# Patient Record
Sex: MALE | Race: WHITE
[De-identification: names, ages, dates, MRNs, and addresses within clinical notes are randomized per-mention and may not be internally consistent; named-entity substitution may affect disease eponyms.]

---

## 2022-04-07 NOTE — NUR
BIBS C/O FEELING S/I WITH PLAN TO OD ON PILLS [facility]. PATIENT ALERT AND ORIENTED X3. AMBULATORY WITH NON LABORED 
BREATHING IN BED 18 AWAITING MD TONEY.

## 2022-04-08 NOTE — NUR
[facility] Personnel here to transport patient. NO acute changes, NO 
distress noted. Ambulatory and cooperative during transport
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
## 2022-05-21 NOTE — NUR
PT BIB SELF C/O SI "I WANT TO OD ON PILLS" REQUESTING VOLUNTARY PSYCH ADMISSION 
TO [facility]. PT IS AAOX4, NOT IN RESPIRATORY DISTRESS, V/S STABLE, KEPT 
RESTED AND COMFORTABLE. WILL CONTINUE TO MONITOR.

## 2022-05-22 NOTE — NUR
PT IS ACCEPTED AT [facility] UNDER THE CARE OF DR. LINTON. CALL [phone]. PT WILL BE ACCOMODATED FOR ADMISSION AT 1500.

## 2022-05-22 NOTE — NUR
ASSESSED PT ON BED ASLEEP EASILY AROUSABLE, AAOX4, NOT IN RESPIRATORY DISTRESS, 
V/S STABLE, KEPT RESTED AND COMFORTABLE. WILL CONTINUE TO MONITOR.

## 2022-06-01 NOTE — NUR
Pt. accepted to [facility] under Dr. Zapata. Call and report to Unit [address] [[phone] 
ext. [phone]].
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
## 2022-09-08 ENCOUNTER — HOSPITAL ENCOUNTER (EMERGENCY)
Dept: HOSPITAL 54 - ER | Age: 37
Discharge: PSYCHIATRIC OR SUBSTANCE USE FACILITY | End: 2022-09-08
Payer: COMMERCIAL

## 2022-09-08 VITALS — WEIGHT: 185 LBS | HEIGHT: 68 IN | BODY MASS INDEX: 28.04 KG/M2

## 2022-09-08 VITALS — SYSTOLIC BLOOD PRESSURE: 131 MMHG | DIASTOLIC BLOOD PRESSURE: 88 MMHG

## 2022-09-08 DIAGNOSIS — R03.0: ICD-10-CM

## 2022-09-08 DIAGNOSIS — F12.10: ICD-10-CM

## 2022-09-08 DIAGNOSIS — Z20.822: ICD-10-CM

## 2022-09-08 DIAGNOSIS — R45.851: Primary | ICD-10-CM

## 2022-09-08 DIAGNOSIS — F41.9: ICD-10-CM

## 2022-09-08 LAB
ALBUMIN SERPL BCP-MCNC: 4.1 G/DL (ref 3.4–5)
ALP SERPL-CCNC: 96 U/L (ref 46–116)
ALT SERPL W P-5'-P-CCNC: 31 U/L (ref 12–78)
APAP SERPL-MCNC: < 10 UG/ML (ref 10–30)
AST SERPL W P-5'-P-CCNC: 5 U/L (ref 15–37)
BASOPHILS # BLD AUTO: 0 K/UL (ref 0–0.2)
BASOPHILS NFR BLD AUTO: 0.3 % (ref 0–2)
BILIRUB DIRECT SERPL-MCNC: 0.1 MG/DL (ref 0–0.2)
BILIRUB SERPL-MCNC: 0.1 MG/DL (ref 0.2–1)
BILIRUB UR QL STRIP: NEGATIVE
BUN SERPL-MCNC: 17 MG/DL (ref 7–18)
CALCIUM SERPL-MCNC: 9.6 MG/DL (ref 8.5–10.1)
CHLORIDE SERPL-SCNC: 100 MMOL/L (ref 98–107)
CO2 SERPL-SCNC: 29 MMOL/L (ref 21–32)
COLOR UR: YELLOW
CREAT SERPL-MCNC: 1.4 MG/DL (ref 0.6–1.3)
EOSINOPHIL NFR BLD AUTO: 0.3 % (ref 0–6)
ETHANOL SERPL-MCNC: < 3 MG/DL (ref 0–0)
GLUCOSE SERPL-MCNC: 111 MG/DL (ref 74–106)
GLUCOSE UR STRIP-MCNC: NEGATIVE MG/DL
HCT VFR BLD AUTO: 46 % (ref 39–51)
HGB BLD-MCNC: 14.9 G/DL (ref 13.5–17.5)
LEUKOCYTE ESTERASE UR QL STRIP: NEGATIVE
LYMPHOCYTES NFR BLD AUTO: 1.3 K/UL (ref 0.8–4.8)
LYMPHOCYTES NFR BLD AUTO: 16.4 % (ref 20–44)
MCHC RBC AUTO-ENTMCNC: 33 G/DL (ref 31–36)
MCV RBC AUTO: 93 FL (ref 80–96)
MONOCYTES NFR BLD AUTO: 0.5 K/UL (ref 0.1–1.3)
MONOCYTES NFR BLD AUTO: 7 % (ref 2–12)
NEUTROPHILS # BLD AUTO: 5.9 K/UL (ref 1.8–8.9)
NEUTROPHILS NFR BLD AUTO: 76 % (ref 43–81)
NITRITE UR QL STRIP: NEGATIVE
PH UR STRIP: 6 [PH] (ref 5–8)
PLATELET # BLD AUTO: 279 K/UL (ref 150–450)
POTASSIUM SERPL-SCNC: 4 MMOL/L (ref 3.5–5.1)
PROT SERPL-MCNC: 8.2 G/DL (ref 6.4–8.2)
PROT UR QL STRIP: NEGATIVE MG/DL
RBC # BLD AUTO: 4.97 MIL/UL (ref 4.5–6)
SODIUM SERPL-SCNC: 140 MMOL/L (ref 136–145)
UROBILINOGEN UR STRIP-MCNC: 0.2 EU/DL
WBC NRBC COR # BLD AUTO: 7.7 K/UL (ref 4.3–11)

## 2022-09-08 PROCEDURE — 87426 SARSCOV CORONAVIRUS AG IA: CPT

## 2022-09-08 PROCEDURE — G0480 DRUG TEST DEF 1-7 CLASSES: HCPCS

## 2022-09-08 PROCEDURE — 80143 DRUG ASSAY ACETAMINOPHEN: CPT

## 2022-09-08 PROCEDURE — 80320 DRUG SCREEN QUANTALCOHOLS: CPT

## 2022-09-08 PROCEDURE — 36415 COLL VENOUS BLD VENIPUNCTURE: CPT

## 2022-09-08 PROCEDURE — 80048 BASIC METABOLIC PNL TOTAL CA: CPT

## 2022-09-08 PROCEDURE — C9803 HOPD COVID-19 SPEC COLLECT: HCPCS

## 2022-09-08 PROCEDURE — 81003 URINALYSIS AUTO W/O SCOPE: CPT

## 2022-09-08 PROCEDURE — 99285 EMERGENCY DEPT VISIT HI MDM: CPT

## 2022-09-08 PROCEDURE — 80307 DRUG TEST PRSMV CHEM ANLYZR: CPT

## 2022-09-08 PROCEDURE — 80076 HEPATIC FUNCTION PANEL: CPT

## 2022-09-08 PROCEDURE — 85025 COMPLETE CBC W/AUTO DIFF WBC: CPT

## 2022-09-08 NOTE — NUR
ACCEPTED AT [facility] UNDER DR LÓPEZ 

# REPORT [phone]

TRANSPORT ETA [id number]

## 2022-09-08 NOTE — NUR
PRESENTED TO THE ER FOR C/O SI, PLANNING TO OD ON HIS MEDICATION REQUESTING 
COLUNTARY PSYCH ADMISSSION. A, OX4. AMBULATORY WITH STEADY GAITS TO THE 
BATHROOM. URINE SAMLE OBTAINED. COVID SWAB DONE AND SENT TO THE LAB. PATIENT 
WAS GOWNED UP. BELONGING TAKEN AWAY. SECURITY WAS CALLED TO WAND THE PT AND PT 
WAS PLACED ON SI PRECAUTION. WILL CONT TO MONITOR

## 2022-11-11 ENCOUNTER — HOSPITAL ENCOUNTER (EMERGENCY)
Dept: HOSPITAL 54 - ER | Age: 37
Discharge: HOME | End: 2022-11-11
Payer: COMMERCIAL

## 2022-11-11 VITALS — SYSTOLIC BLOOD PRESSURE: 132 MMHG | DIASTOLIC BLOOD PRESSURE: 84 MMHG

## 2022-11-11 VITALS — BODY MASS INDEX: 27.28 KG/M2 | WEIGHT: 180 LBS | HEIGHT: 68 IN

## 2022-11-11 DIAGNOSIS — R03.0: ICD-10-CM

## 2022-11-11 DIAGNOSIS — Z20.822: ICD-10-CM

## 2022-11-11 DIAGNOSIS — F32.A: Primary | ICD-10-CM

## 2022-11-11 DIAGNOSIS — F41.9: ICD-10-CM

## 2022-11-11 LAB
ALBUMIN SERPL BCP-MCNC: 3.9 G/DL (ref 3.4–5)
ALP SERPL-CCNC: 105 U/L (ref 46–116)
ALT SERPL W P-5'-P-CCNC: 25 U/L (ref 12–78)
APAP SERPL-MCNC: < 10 UG/ML (ref 10–30)
AST SERPL W P-5'-P-CCNC: 24 U/L (ref 15–37)
BASOPHILS # BLD AUTO: 0 K/UL (ref 0–0.2)
BASOPHILS NFR BLD AUTO: 0.3 % (ref 0–2)
BILIRUB DIRECT SERPL-MCNC: 0.1 MG/DL (ref 0–0.2)
BILIRUB SERPL-MCNC: 0.5 MG/DL (ref 0.2–1)
BILIRUB UR QL STRIP: NEGATIVE
BUN SERPL-MCNC: 12 MG/DL (ref 7–18)
CALCIUM SERPL-MCNC: 9 MG/DL (ref 8.5–10.1)
CHLORIDE SERPL-SCNC: 101 MMOL/L (ref 98–107)
CO2 SERPL-SCNC: 31 MMOL/L (ref 21–32)
COLOR UR: YELLOW
CREAT SERPL-MCNC: 1.1 MG/DL (ref 0.6–1.3)
EOSINOPHIL NFR BLD AUTO: 1.7 % (ref 0–6)
ETHANOL SERPL-MCNC: < 3 MG/DL (ref 0–0)
GLUCOSE SERPL-MCNC: 99 MG/DL (ref 74–106)
GLUCOSE UR STRIP-MCNC: NEGATIVE MG/DL
HCT VFR BLD AUTO: 44 % (ref 39–51)
HGB BLD-MCNC: 14.8 G/DL (ref 13.5–17.5)
LEUKOCYTE ESTERASE UR QL STRIP: NEGATIVE
LYMPHOCYTES NFR BLD AUTO: 1.3 K/UL (ref 0.8–4.8)
LYMPHOCYTES NFR BLD AUTO: 24.3 % (ref 20–44)
MCHC RBC AUTO-ENTMCNC: 33 G/DL (ref 31–36)
MCV RBC AUTO: 90 FL (ref 80–96)
MONOCYTES NFR BLD AUTO: 0.4 K/UL (ref 0.1–1.3)
MONOCYTES NFR BLD AUTO: 8.4 % (ref 2–12)
NEUTROPHILS # BLD AUTO: 3.4 K/UL (ref 1.8–8.9)
NEUTROPHILS NFR BLD AUTO: 65.3 % (ref 43–81)
NITRITE UR QL STRIP: NEGATIVE
PH UR STRIP: 6.5 [PH] (ref 5–8)
PLATELET # BLD AUTO: 270 K/UL (ref 150–450)
POTASSIUM SERPL-SCNC: 4 MMOL/L (ref 3.5–5.1)
PROT SERPL-MCNC: 8 G/DL (ref 6.4–8.2)
PROT UR QL STRIP: NEGATIVE MG/DL
RBC # BLD AUTO: 4.94 MIL/UL (ref 4.5–6)
SODIUM SERPL-SCNC: 141 MMOL/L (ref 136–145)
UROBILINOGEN UR STRIP-MCNC: 1 EU/DL
WBC NRBC COR # BLD AUTO: 5.2 K/UL (ref 4.3–11)

## 2022-11-11 PROCEDURE — 87426 SARSCOV CORONAVIRUS AG IA: CPT

## 2022-11-11 PROCEDURE — 80320 DRUG SCREEN QUANTALCOHOLS: CPT

## 2022-11-11 PROCEDURE — 80048 BASIC METABOLIC PNL TOTAL CA: CPT

## 2022-11-11 PROCEDURE — 80143 DRUG ASSAY ACETAMINOPHEN: CPT

## 2022-11-11 PROCEDURE — 81003 URINALYSIS AUTO W/O SCOPE: CPT

## 2022-11-11 PROCEDURE — 36415 COLL VENOUS BLD VENIPUNCTURE: CPT

## 2022-11-11 PROCEDURE — G0480 DRUG TEST DEF 1-7 CLASSES: HCPCS

## 2022-11-11 PROCEDURE — 80307 DRUG TEST PRSMV CHEM ANLYZR: CPT

## 2022-11-11 PROCEDURE — 80076 HEPATIC FUNCTION PANEL: CPT

## 2022-11-11 PROCEDURE — 85025 COMPLETE CBC W/AUTO DIFF WBC: CPT

## 2022-11-11 PROCEDURE — 99285 EMERGENCY DEPT VISIT HI MDM: CPT

## 2022-11-11 PROCEDURE — C9803 HOPD COVID-19 SPEC COLLECT: HCPCS

## 2022-11-11 NOTE — NUR
-------------------------------------------------------------------------------

           *** Note undone in ED - 11/11/22 at 1405 by CRISTOBAL ***           

-------------------------------------------------------------------------------

SPOKE WITH DWAINE AMARO ACCEPTED TO [facility] 

UNDER DR PENA

NUMBER [phone] REPORT [phone]

## 2022-11-11 NOTE — NUR
DWAINE STATED HE WILL CALL US UP WITH  INFO OR LET US KNOW IF HE WOULD 
LIKE US TO SET UP TRANSPORTATION

## 2022-11-11 NOTE — NUR
[place] C/O SI WITH OUT PLAN. DEPRESSED AND REQUESTING VOLUNTARY PSYCH 
ADMISSION. PT CHANGED INTO A GOWN AND BELONGINGS COLLECTED. WANDED BY SECURITY. 
URINE AND COVID COLLECTED AND SENT TO LAB. SAFETY MEASURES IN PLACE.

## 2022-11-11 NOTE — NUR
SPOKE WITH DWAINE AMARO [facility] 

UNDER DR PENA

NUMBER [phone] [phone]